# Patient Record
Sex: MALE | URBAN - METROPOLITAN AREA
[De-identification: names, ages, dates, MRNs, and addresses within clinical notes are randomized per-mention and may not be internally consistent; named-entity substitution may affect disease eponyms.]

---

## 2017-08-29 ENCOUNTER — HISTORICAL (OUTPATIENT)
Dept: ADMINISTRATIVE | Facility: HOSPITAL | Age: 22
End: 2017-08-29

## 2019-05-24 ENCOUNTER — HISTORICAL (OUTPATIENT)
Dept: PREADMISSION TESTING | Facility: HOSPITAL | Age: 24
End: 2019-05-24

## 2019-05-24 ENCOUNTER — HISTORICAL (OUTPATIENT)
Dept: LAB | Facility: HOSPITAL | Age: 24
End: 2019-05-24

## 2019-05-24 LAB
ABS NEUT (OLG): 4.72 X10(3)/MCL (ref 2.1–9.2)
APTT PPP: 32.3 SECOND(S) (ref 24.2–33.9)
BASOPHILS # BLD AUTO: 0.1 X10(3)/MCL (ref 0–0.2)
BASOPHILS NFR BLD AUTO: 1 %
EOSINOPHIL # BLD AUTO: 0.3 X10(3)/MCL (ref 0–0.9)
EOSINOPHIL NFR BLD AUTO: 4 %
ERYTHROCYTE [DISTWIDTH] IN BLOOD BY AUTOMATED COUNT: 13.9 % (ref 11.5–17)
HCT VFR BLD AUTO: 44.9 % (ref 42–52)
HGB BLD-MCNC: 14.6 GM/DL (ref 14–18)
INR PPP: 1 (ref 0–1.3)
LYMPHOCYTES # BLD AUTO: 2.2 X10(3)/MCL (ref 0.6–4.6)
LYMPHOCYTES NFR BLD AUTO: 28 %
MCH RBC QN AUTO: 29.7 PG (ref 27–31)
MCHC RBC AUTO-ENTMCNC: 32.5 GM/DL (ref 33–36)
MCV RBC AUTO: 91.4 FL (ref 80–94)
MONOCYTES # BLD AUTO: 0.5 X10(3)/MCL (ref 0.1–1.3)
MONOCYTES NFR BLD AUTO: 7 %
NEUTROPHILS # BLD AUTO: 4.72 X10(3)/MCL (ref 2.1–9.2)
NEUTROPHILS NFR BLD AUTO: 60 %
PLATELET # BLD AUTO: 215 X10(3)/MCL (ref 130–400)
PMV BLD AUTO: 9.9 FL (ref 9.4–12.4)
PROTHROMBIN TIME: 13.7 SECOND(S) (ref 12–14)
RBC # BLD AUTO: 4.91 X10(6)/MCL (ref 4.7–6.1)
WBC # SPEC AUTO: 7.8 X10(3)/MCL (ref 4.5–11.5)

## 2019-05-30 ENCOUNTER — HISTORICAL (OUTPATIENT)
Dept: SURGERY | Facility: HOSPITAL | Age: 24
End: 2019-05-30

## 2022-04-10 ENCOUNTER — HISTORICAL (OUTPATIENT)
Dept: ADMINISTRATIVE | Facility: HOSPITAL | Age: 27
End: 2022-04-10

## 2022-04-28 VITALS
OXYGEN SATURATION: 98 % | BODY MASS INDEX: 29.14 KG/M2 | DIASTOLIC BLOOD PRESSURE: 74 MMHG | HEIGHT: 71 IN | WEIGHT: 208.13 LBS | SYSTOLIC BLOOD PRESSURE: 134 MMHG

## 2024-08-20 ENCOUNTER — TELEPHONE (OUTPATIENT)
Dept: HEMATOLOGY/ONCOLOGY | Facility: CLINIC | Age: 29
End: 2024-08-20

## 2024-08-20 NOTE — PROGRESS NOTES
Subjective     Patient ID: Marciano Leung III is a 29 y.o. male.    Chief Complaint: No chief complaint on file.    HPI  Review of Systems       Objective   Mr. Dobbs canceled appointment.  Physical Exam       Assessment and Plan         New plan is to establish phone contact weekly.         Follow up in about 1 week (around 8/27/2024).

## 2024-08-20 NOTE — TELEPHONE ENCOUNTER
Mr. Tariq called to cancel his appointment. He is having transportation issues. We spent about 30 minutes on the phone discussing his emotions and disappointments. He would like to schedule an appointment, however, his transportation is not reliable. I offered to schedule phone contact weekly until his transportation issue is resolved. He was happy to hear that is an option.